# Patient Record
Sex: FEMALE | Race: WHITE | NOT HISPANIC OR LATINO | URBAN - METROPOLITAN AREA
[De-identification: names, ages, dates, MRNs, and addresses within clinical notes are randomized per-mention and may not be internally consistent; named-entity substitution may affect disease eponyms.]

---

## 2020-12-21 ENCOUNTER — WEB ENCOUNTER (OUTPATIENT)
Dept: URBAN - METROPOLITAN AREA CLINIC 35 | Facility: CLINIC | Age: 40
End: 2020-12-21

## 2020-12-23 ENCOUNTER — OFFICE VISIT (OUTPATIENT)
Dept: URBAN - METROPOLITAN AREA CLINIC 35 | Facility: CLINIC | Age: 40
End: 2020-12-23

## 2020-12-23 ENCOUNTER — TELEPHONE ENCOUNTER (OUTPATIENT)
Dept: URBAN - METROPOLITAN AREA CLINIC 35 | Facility: CLINIC | Age: 40
End: 2020-12-23

## 2020-12-23 VITALS — HEIGHT: 65 IN | WEIGHT: 220 LBS | BODY MASS INDEX: 36.65 KG/M2

## 2020-12-23 RX ORDER — TRAZODONE HYDROCHLORIDE 100 MG/1
1 TABLET AT BEDTIME TABLET, FILM COATED ORAL ONCE A DAY
Qty: 30 | Status: ACTIVE | COMMUNITY

## 2020-12-23 RX ORDER — SODIUM PICOSULFATE, MAGNESIUM OXIDE, AND ANHYDROUS CITRIC ACID 10; 3.5; 12 MG/160ML; G/160ML; G/160ML
160 ML LIQUID ORAL
Qty: 1 KIT | Refills: 0 | OUTPATIENT
Start: 2020-12-23

## 2020-12-23 RX ORDER — FLUOXETINE HYDROCHLORIDE 40 MG/1
1 CAPSULE CAPSULE ORAL ONCE A DAY
Qty: 30 | Status: ACTIVE | COMMUNITY

## 2020-12-23 RX ORDER — THYROID, PORCINE 180 MG/1
1 TABLET ON AN EMPTY STOMACH TABLET ORAL ONCE A DAY
Qty: 30 | Status: ACTIVE | COMMUNITY

## 2020-12-23 NOTE — HPI-MIGRATED HPI
;   ;   ;   ;     Celiac disease : Patient admits to Celiac disease via labs drawn from her PCP.  She does admit to having diarrhea daily for 3 years since her gallbladder was removed, but can occasionally have constipation.  She has history of laxative abuse.  History of IBS-M since 2012.;   Bloating/Gas : She admits to bloating, and she feels full all the time.  She will also have gas pains.;   Rectal Bleeding : Patient has been havinig rectal bleeding for the past year.  She states her bleeding has been intermittent.  She is on Diclofenac or ibuprofen as needed due to an back injury over the summer.  She takes 800mg ibuprofen several times a week.  Bleeding is bright red, and appears to be more frequent since she's sitting more.  She also complains of rectal pain.  She sees blood in the stool and a lot in the toilet water.  She admits to 3-4 BMs a day to none at all.  She states her BMs have not been normal since having her GB removed. She states her stools can be watery.  Labs drawn last week, and her FOBT was negative.  She is awaiting rest of labs still. Patient denies any lightheadeness, dizziness, fatigue.  Patient denies a previous colonoscopy.  ;   Abdominal Pain : Patient complains of upper abd pain and lower abd pain, especially when stressed out.  She also says certain foods will cause her pain.  She tries to be on a low carbohydrate diet and that seems to work better for her.  ;

## 2021-01-07 ENCOUNTER — TELEPHONE ENCOUNTER (OUTPATIENT)
Dept: URBAN - METROPOLITAN AREA CLINIC 35 | Facility: CLINIC | Age: 41
End: 2021-01-07

## 2021-01-11 ENCOUNTER — OFFICE VISIT (OUTPATIENT)
Dept: URBAN - METROPOLITAN AREA SURGERY CENTER 8 | Facility: SURGERY CENTER | Age: 41
End: 2021-01-11

## 2021-01-14 ENCOUNTER — OFFICE VISIT (OUTPATIENT)
Dept: URBAN - METROPOLITAN AREA CLINIC 35 | Facility: CLINIC | Age: 41
End: 2021-01-14

## 2021-01-20 ENCOUNTER — WEB ENCOUNTER (OUTPATIENT)
Dept: URBAN - METROPOLITAN AREA CLINIC 35 | Facility: CLINIC | Age: 41
End: 2021-01-20

## 2021-01-21 ENCOUNTER — TELEPHONE ENCOUNTER (OUTPATIENT)
Dept: URBAN - METROPOLITAN AREA CLINIC 35 | Facility: CLINIC | Age: 41
End: 2021-01-21

## 2021-01-21 ENCOUNTER — WEB ENCOUNTER (OUTPATIENT)
Dept: URBAN - METROPOLITAN AREA CLINIC 35 | Facility: CLINIC | Age: 41
End: 2021-01-21

## 2021-01-27 ENCOUNTER — OFFICE VISIT (OUTPATIENT)
Dept: URBAN - METROPOLITAN AREA CLINIC 35 | Facility: CLINIC | Age: 41
End: 2021-01-27

## 2021-01-27 VITALS — HEIGHT: 65 IN | BODY MASS INDEX: 36.65 KG/M2 | WEIGHT: 220 LBS

## 2021-01-27 PROBLEM — 396331005 CELIAC DISEASE: Status: ACTIVE | Noted: 2020-12-23

## 2021-01-27 RX ORDER — THYROID, PORCINE 180 MG/1
1 TABLET ON AN EMPTY STOMACH TABLET ORAL ONCE A DAY
Qty: 30 | Status: ACTIVE | COMMUNITY

## 2021-01-27 RX ORDER — TRAZODONE HYDROCHLORIDE 100 MG/1
1 TABLET AT BEDTIME TABLET, FILM COATED ORAL ONCE A DAY
Qty: 30 | Status: ACTIVE | COMMUNITY

## 2021-01-27 RX ORDER — FLUOXETINE HYDROCHLORIDE 40 MG/1
1 CAPSULE CAPSULE ORAL ONCE A DAY
Qty: 30 | Status: ACTIVE | COMMUNITY

## 2021-01-27 RX ORDER — SODIUM PICOSULFATE, MAGNESIUM OXIDE, AND ANHYDROUS CITRIC ACID 10; 3.5; 12 MG/160ML; G/160ML; G/160ML
160 ML LIQUID ORAL
Qty: 1 KIT | Refills: 0 | Status: DISCONTINUED | COMMUNITY
Start: 2020-12-23

## 2021-01-27 NOTE — HPI-MIGRATED HPI
;   ;   ;   ;     Celiac disease : She is scheduled to have EGD on 02/22/2021. Patient has 1-2 bowel movements a day. Stools are loose. Denies abd pain.  Last visit (12/23/2020)                Patient admits to Celiac disease via labs drawn from her PCP.  She does admit to having diarrhea daily for 3 years since her gallbladder was removed, but can occasionally have constipation.  She has history of laxative abuse.  History of IBS-M since 2012.;   Bloating/Gas : She is scheduled to have EGD on 02/22/2021. Denies worsening bloating/gas.   Last visit (12/23/2020)                She admits to bloating, and she feels full all the time.  She will also have gas pains.;   Rectal Bleeding : Patient is a 41-year-old  female who presents today to follow up s/p colonoscopy. Colonoscopy performed on 01/11/2021. Patient denies any complications after the procedure. Admits additional one episode of BRB rectal bleeding a week after the procedure. Patient has 1-2 bowel movements a day. Stools are loose. Patient denies abdominal pain, melena, mucus, or blood in stool.    Last visit (12/23/2020)                Patient has been havinig rectal bleeding for the past year.  She states her bleeding has been intermittent.  She is on Diclofenac or ibuprofen as needed due to an back injury over the summer.  She takes 800mg ibuprofen several times a week.  Bleeding is bright red, and appears to be more frequent since she's sitting more.  She also complains of rectal pain.  She sees blood in the stool and a lot in the toilet water.  She admits to 3-4 BMs a day to none at all.  She states her BMs have not been normal since having her GB removed. She states her stools can be watery.  Labs drawn last week, and her FOBT was negative.  She is awaiting rest of labs still. Patient denies any lightheadeness, dizziness, fatigue.  Patient denies a previous colonoscopy.  ;   Abdominal Pain : Colonoscopy performed on 01/11/2021. Patient denies any complications after the procedure. She is scheduled to have EGD on 02/22/2021. Denies abdominal pain.  She states since improving her diet, her symptoms have noticeably improved.  Last visit (12/23/2020)                Patient complains of upper abd pain and lower abd pain, especially when stressed out.  She also says certain foods will cause her pain.  She tries to be on a low carbohydrate diet and that seems to work better for her.  ;

## 2021-02-15 ENCOUNTER — OFFICE VISIT (OUTPATIENT)
Dept: URBAN - METROPOLITAN AREA SURGERY CENTER 8 | Facility: SURGERY CENTER | Age: 41
End: 2021-02-15

## 2021-02-22 ENCOUNTER — OFFICE VISIT (OUTPATIENT)
Dept: URBAN - METROPOLITAN AREA SURGERY CENTER 8 | Facility: SURGERY CENTER | Age: 41
End: 2021-02-22

## 2021-03-08 ENCOUNTER — OFFICE VISIT (OUTPATIENT)
Dept: URBAN - METROPOLITAN AREA CLINIC 35 | Facility: CLINIC | Age: 41
End: 2021-03-08

## 2021-04-12 ENCOUNTER — OFFICE VISIT (OUTPATIENT)
Dept: URBAN - METROPOLITAN AREA SURGERY CENTER 8 | Facility: SURGERY CENTER | Age: 41
End: 2021-04-12

## 2021-04-28 ENCOUNTER — OFFICE VISIT (OUTPATIENT)
Dept: URBAN - METROPOLITAN AREA CLINIC 35 | Facility: CLINIC | Age: 41
End: 2021-04-28

## 2022-01-18 ENCOUNTER — OFFICE VISIT (OUTPATIENT)
Dept: URBAN - METROPOLITAN AREA CLINIC 33 | Facility: CLINIC | Age: 42
End: 2022-01-18